# Patient Record
Sex: FEMALE | Race: WHITE | NOT HISPANIC OR LATINO | URBAN - METROPOLITAN AREA
[De-identification: names, ages, dates, MRNs, and addresses within clinical notes are randomized per-mention and may not be internally consistent; named-entity substitution may affect disease eponyms.]

---

## 2021-10-11 ENCOUNTER — EMERGENCY (EMERGENCY)
Facility: HOSPITAL | Age: 51
LOS: 1 days | Discharge: HOME | End: 2021-10-11
Attending: EMERGENCY MEDICINE
Payer: COMMERCIAL

## 2021-10-11 VITALS
SYSTOLIC BLOOD PRESSURE: 121 MMHG | HEIGHT: 62.5 IN | DIASTOLIC BLOOD PRESSURE: 78 MMHG | WEIGHT: 104.94 LBS | OXYGEN SATURATION: 100 % | HEART RATE: 77 BPM | TEMPERATURE: 98 F | RESPIRATION RATE: 20 BRPM

## 2021-10-11 LAB
ALBUMIN SERPL ELPH-MCNC: 5 G/DL — SIGNIFICANT CHANGE UP (ref 3.3–5)
ALP SERPL-CCNC: 74 U/L — SIGNIFICANT CHANGE UP (ref 40–120)
ALT FLD-CCNC: 20 U/L — SIGNIFICANT CHANGE UP (ref 10–45)
ANION GAP SERPL CALC-SCNC: 14 MMOL/L — SIGNIFICANT CHANGE UP (ref 5–17)
APTT BLD: 36.6 SEC — HIGH (ref 27.5–35.5)
AST SERPL-CCNC: 24 U/L — SIGNIFICANT CHANGE UP (ref 10–40)
BASOPHILS # BLD AUTO: 0.04 K/UL — SIGNIFICANT CHANGE UP (ref 0–0.2)
BASOPHILS NFR BLD AUTO: 0.5 % — SIGNIFICANT CHANGE UP (ref 0–2)
BILIRUB SERPL-MCNC: 0.7 MG/DL — SIGNIFICANT CHANGE UP (ref 0.2–1.2)
BUN SERPL-MCNC: 13 MG/DL — SIGNIFICANT CHANGE UP (ref 7–23)
CALCIUM SERPL-MCNC: 9.9 MG/DL — SIGNIFICANT CHANGE UP (ref 8.4–10.5)
CHLORIDE SERPL-SCNC: 100 MMOL/L — SIGNIFICANT CHANGE UP (ref 96–108)
CO2 SERPL-SCNC: 23 MMOL/L — SIGNIFICANT CHANGE UP (ref 22–31)
CREAT SERPL-MCNC: 0.62 MG/DL — SIGNIFICANT CHANGE UP (ref 0.5–1.3)
EOSINOPHIL # BLD AUTO: 0.21 K/UL — SIGNIFICANT CHANGE UP (ref 0–0.5)
EOSINOPHIL NFR BLD AUTO: 2.7 % — SIGNIFICANT CHANGE UP (ref 0–6)
GLUCOSE SERPL-MCNC: 97 MG/DL — SIGNIFICANT CHANGE UP (ref 70–99)
HCT VFR BLD CALC: 43.3 % — SIGNIFICANT CHANGE UP (ref 34.5–45)
HGB BLD-MCNC: 14.9 G/DL — SIGNIFICANT CHANGE UP (ref 11.5–15.5)
IMM GRANULOCYTES NFR BLD AUTO: 0.3 % — SIGNIFICANT CHANGE UP (ref 0–1.5)
INR BLD: 0.97 RATIO — SIGNIFICANT CHANGE UP (ref 0.88–1.16)
LYMPHOCYTES # BLD AUTO: 2.27 K/UL — SIGNIFICANT CHANGE UP (ref 1–3.3)
LYMPHOCYTES # BLD AUTO: 29.3 % — SIGNIFICANT CHANGE UP (ref 13–44)
MCHC RBC-ENTMCNC: 31.7 PG — SIGNIFICANT CHANGE UP (ref 27–34)
MCHC RBC-ENTMCNC: 34.4 GM/DL — SIGNIFICANT CHANGE UP (ref 32–36)
MCV RBC AUTO: 92.1 FL — SIGNIFICANT CHANGE UP (ref 80–100)
MONOCYTES # BLD AUTO: 0.53 K/UL — SIGNIFICANT CHANGE UP (ref 0–0.9)
MONOCYTES NFR BLD AUTO: 6.8 % — SIGNIFICANT CHANGE UP (ref 2–14)
NEUTROPHILS # BLD AUTO: 4.68 K/UL — SIGNIFICANT CHANGE UP (ref 1.8–7.4)
NEUTROPHILS NFR BLD AUTO: 60.4 % — SIGNIFICANT CHANGE UP (ref 43–77)
NRBC # BLD: 0 /100 WBCS — SIGNIFICANT CHANGE UP (ref 0–0)
PLATELET # BLD AUTO: 319 K/UL — SIGNIFICANT CHANGE UP (ref 150–400)
POTASSIUM SERPL-MCNC: 3.9 MMOL/L — SIGNIFICANT CHANGE UP (ref 3.5–5.3)
POTASSIUM SERPL-SCNC: 3.9 MMOL/L — SIGNIFICANT CHANGE UP (ref 3.5–5.3)
PROT SERPL-MCNC: 8 G/DL — SIGNIFICANT CHANGE UP (ref 6–8.3)
PROTHROM AB SERPL-ACNC: 11.6 SEC — SIGNIFICANT CHANGE UP (ref 10.6–13.6)
RBC # BLD: 4.7 M/UL — SIGNIFICANT CHANGE UP (ref 3.8–5.2)
RBC # FLD: 12.8 % — SIGNIFICANT CHANGE UP (ref 10.3–14.5)
SODIUM SERPL-SCNC: 137 MMOL/L — SIGNIFICANT CHANGE UP (ref 135–145)
WBC # BLD: 7.75 K/UL — SIGNIFICANT CHANGE UP (ref 3.8–10.5)
WBC # FLD AUTO: 7.75 K/UL — SIGNIFICANT CHANGE UP (ref 3.8–10.5)

## 2021-10-11 PROCEDURE — 80053 COMPREHEN METABOLIC PANEL: CPT

## 2021-10-11 PROCEDURE — 70450 CT HEAD/BRAIN W/O DYE: CPT | Mod: 26,MG,59

## 2021-10-11 PROCEDURE — G1004: CPT

## 2021-10-11 PROCEDURE — 70450 CT HEAD/BRAIN W/O DYE: CPT | Mod: MG

## 2021-10-11 PROCEDURE — 70498 CT ANGIOGRAPHY NECK: CPT | Mod: MG

## 2021-10-11 PROCEDURE — 99284 EMERGENCY DEPT VISIT MOD MDM: CPT | Mod: 25

## 2021-10-11 PROCEDURE — 85730 THROMBOPLASTIN TIME PARTIAL: CPT

## 2021-10-11 PROCEDURE — 99284 EMERGENCY DEPT VISIT MOD MDM: CPT

## 2021-10-11 PROCEDURE — 70498 CT ANGIOGRAPHY NECK: CPT | Mod: 26,MG

## 2021-10-11 PROCEDURE — 70496 CT ANGIOGRAPHY HEAD: CPT | Mod: 26,MG

## 2021-10-11 PROCEDURE — 70496 CT ANGIOGRAPHY HEAD: CPT | Mod: MG

## 2021-10-11 PROCEDURE — 85610 PROTHROMBIN TIME: CPT

## 2021-10-11 PROCEDURE — 85025 COMPLETE CBC W/AUTO DIFF WBC: CPT

## 2021-10-11 NOTE — CONSULT NOTE ADULT - SUBJECTIVE AND OBJECTIVE BOX
HPI:  51 y.o. R-H F with PMH of strabisumus of R, laser surgery on L (with baseline floaters, dry eyes) presented with L pupil dilation. The sx started on 10/9 PM. Was at the daughter's football game and noted that her L pupil was dilated. No headache, new blurry vision, pain in b/l eyes, syncopal episode, or seizure episodes. Went to opthalmologist on 10/11 PM and exam was unremarkable other than anisocoria. Baseline vision is blurry on R and L vision is "not great" per pt. Was sent to ED to r/o aneurysm. Takes vitamin supplements, fish oil, and calcium. No new medications started. Lives at home with family, no sick contacts, independent with ADLs. No recent eye procedure on b/l eyes. Denies any hx of seizure or stroke. Currently denies any headache, diplopia, other vision changes, n/v, chest pain, abd pain, numbness/pain/tingling/weakness in all extremities.     REVIEW OF SYSTEMS    A 10-system ROS was performed and is negative except for those items noted above and/or in the HPI.    PAST MEDICAL & SURGICAL HISTORY:  No pertinent past medical history    No significant past surgical history      FAMILY HISTORY:    SOCIAL HISTORY: as noted in HPI    MEDICATIONS (HOME):  Home Medications:    MEDICATIONS  (STANDING):    MEDICATIONS  (PRN):    ALLERGIES/INTOLERANCES:  Allergies  Allergy Status Unknown    Intolerances    VITALS & EXAMINATION:  Vital Signs Last 24 Hrs  T(C): 36.9 (11 Oct 2021 17:31), Max: 36.9 (11 Oct 2021 17:31)  T(F): 98.4 (11 Oct 2021 17:31), Max: 98.4 (11 Oct 2021 17:31)  HR: 77 (11 Oct 2021 17:31) (77 - 77)  BP: 121/78 (11 Oct 2021 17:31) (121/78 - 121/78)  BP(mean): --  RR: 20 (11 Oct 2021 17:31) (20 - 20)  SpO2: 100% (11 Oct 2021 17:31) (100% - 100%)    General:  Constitutional: thin Female, appears stated age, in no apparent distress including pain  Head: Normocephalic & atraumatic  EYE: R 20/100 and L 20/25 (baseline vision)    Neurological (>12):  MS: Awake, alert, oriented to person, place, situation, time. Normal affect. Follows all commands.    Language: Speech is clear, fluent with good repetition & comprehension (able to name objects pen, socks, shoes)    CNs: PERRL (R = 3mm, L = 6mm). VFF. EOMI no nystagmus, no diplopia. V1-3 intact to light touch, well developed masseter muscles b/l. No facial asymmetry b/l, full eye closure strength b/l. Hearing grossly normal (rubbing fingers) b/l. Head turning & shoulder shrug intact b/l. Tongue midline, normal movements, no atrophy.    Motor: Normal muscle bulk & tone. No noticeable tremor. No pronator drift.              Deltoid	Biceps	Triceps	Wrist	   R	5	5	5	5	5			  L	5	5	5	5	5		    	H-Flex	H-Ext	K-Flex	K-Ext	D-Flex	P-Flex  R	5	5	5	5	5	5		   L	5	5	5	5	5	5		     Sensation: Intact to light touch throughout    Cortical: Extinction on DSS (neglect): none    Reflexes:              Biceps(C5)       BR(C6)     Patellar(L4)    Achilles(S1)    Plantar Resp  R	2	          2	             2		        2		mute  L	2	          2	             2		        2		mute    Coordination: intact rapid-alt movements. No dysmetria to FTN    LABORATORY:  CBC                       14.9   7.75  )-----------( 319      ( 11 Oct 2021 17:52 )             43.3     Chem 10-11    137  |  100  |  13  ----------------------------<  97  3.9   |  23  |  0.62    Ca    9.9      11 Oct 2021 17:52    TPro  8.0  /  Alb  5.0  /  TBili  0.7  /  DBili  x   /  AST  24  /  ALT  20  /  AlkPhos  74  10-11    LFTs LIVER FUNCTIONS - ( 11 Oct 2021 17:52 )  Alb: 5.0 g/dL / Pro: 8.0 g/dL / ALK PHOS: 74 U/L / ALT: 20 U/L / AST: 24 U/L / GGT: x           Coagulopathy PT/INR - ( 11 Oct 2021 17:52 )   PT: 11.6 sec;   INR: 0.97 ratio         PTT - ( 11 Oct 2021 17:52 )  PTT:36.6 sec    STUDIES & IMAGING:  Studies (EKG, EEG, EMG, etc):     Radiology (XR, CT, MR, U/S, TTE/BRIAN):  < from: CT Angio Head w/ IV Cont (10.11.21 @ 18:18) >  IMPRESSION:    CT brain: No acute intracranial hemorrhage, mass effect, midline shift.    CTA neck and brain: No vessel occlusion or significant stenosis. No aneurysm.    --- End of Report ---    < end of copied text >   HPI:  51 y.o. R-H F with PMH of strabisumus of R, laser surgery on L (with baseline floaters, dry eyes) presented with L pupil dilation. The sx started on 10/9 PM. Was at the daughter's football game and noted that her L pupil was dilated. No headache, new blurry vision, pain in b/l eyes, syncopal episode, or seizure episodes. Went to opthalmologist on 10/11 PM and exam was unremarkable other than anisocoria. Baseline vision is blurry on R and L vision is "not great" per pt. Was sent to ED to r/o aneurysm. Takes vitamin supplements, fish oil, and calcium. No new medications started. Lives at home with family, no sick contacts, independent with ADLs. No recent eye procedure on b/l eyes. Denies any hx of seizure or stroke. Currently denies any headache, diplopia, other vision changes, n/v, chest pain, abd pain, numbness/pain/tingling/weakness in all extremities.     REVIEW OF SYSTEMS    A 10-system ROS was performed and is negative except for those items noted above and/or in the HPI.    PAST MEDICAL & SURGICAL HISTORY:  No pertinent past medical history    No significant past surgical history      FAMILY HISTORY:    SOCIAL HISTORY: as noted in HPI    MEDICATIONS (HOME):  Home Medications:    MEDICATIONS  (STANDING):    MEDICATIONS  (PRN):    ALLERGIES/INTOLERANCES:  Allergies  Allergy Status Unknown    Intolerances    VITALS & EXAMINATION:  Vital Signs Last 24 Hrs  T(C): 36.9 (11 Oct 2021 17:31), Max: 36.9 (11 Oct 2021 17:31)  T(F): 98.4 (11 Oct 2021 17:31), Max: 98.4 (11 Oct 2021 17:31)  HR: 77 (11 Oct 2021 17:31) (77 - 77)  BP: 121/78 (11 Oct 2021 17:31) (121/78 - 121/78)  BP(mean): --  RR: 20 (11 Oct 2021 17:31) (20 - 20)  SpO2: 100% (11 Oct 2021 17:31) (100% - 100%)    General:  Constitutional: thin Female, appears stated age, in no apparent distress including pain  Head: Normocephalic & atraumatic  EYE: R 20/100 and L 20/25 (baseline vision)    Neurological (>12):  MS: Awake, alert, oriented to person, place, situation, time. Normal affect. Follows all commands.    Language: Speech is clear, fluent with good repetition & comprehension (able to name objects pen, socks, shoes)    CNs: PERRL (R = 3mm, L = 6mm). No ptosis. Normal gaze at natural gaze; no out and downward gaze noted. VFF. EOMI no nystagmus, no diplopia. V1-3 intact to light touch, well developed masseter muscles b/l. No facial asymmetry b/l, full eye closure strength b/l. Hearing grossly normal (rubbing fingers) b/l. Head turning & shoulder shrug intact b/l. Tongue midline, normal movements, no atrophy.    Motor: Normal muscle bulk & tone. No noticeable tremor.              Deltoid	Biceps	Triceps	Wrist	   R	5	5	5	5	5			  L	5	5	5	5	5		    	H-Flex	H-Ext	K-Flex	K-Ext	D-Flex	P-Flex  R	5	5	5	5	5	5		   L	5	5	5	5	5	5		     Sensation: Intact to light touch throughout    Cortical: Extinction on DSS (neglect): none    Reflexes:              Biceps(C5)       BR(C6)     Patellar(L4)    Achilles(S1)    Plantar Resp  R	2	          2	             2		        2		mute  L	2	          2	             2		        2		mute    Coordination: intact rapid-alt movements. No dysmetria to FTN    LABORATORY:  CBC                       14.9   7.75  )-----------( 319      ( 11 Oct 2021 17:52 )             43.3     Chem 10-11    137  |  100  |  13  ----------------------------<  97  3.9   |  23  |  0.62    Ca    9.9      11 Oct 2021 17:52    TPro  8.0  /  Alb  5.0  /  TBili  0.7  /  DBili  x   /  AST  24  /  ALT  20  /  AlkPhos  74  10-11    LFTs LIVER FUNCTIONS - ( 11 Oct 2021 17:52 )  Alb: 5.0 g/dL / Pro: 8.0 g/dL / ALK PHOS: 74 U/L / ALT: 20 U/L / AST: 24 U/L / GGT: x           Coagulopathy PT/INR - ( 11 Oct 2021 17:52 )   PT: 11.6 sec;   INR: 0.97 ratio         PTT - ( 11 Oct 2021 17:52 )  PTT:36.6 sec    STUDIES & IMAGING:  Studies (EKG, EEG, EMG, etc):     Radiology (XR, CT, MR, U/S, TTE/BRIAN):  < from: CT Angio Head w/ IV Cont (10.11.21 @ 18:18) >  IMPRESSION:    CT brain: No acute intracranial hemorrhage, mass effect, midline shift.    CTA neck and brain: No vessel occlusion or significant stenosis. No aneurysm.    --- End of Report ---    < end of copied text >

## 2021-10-11 NOTE — ED ADULT NURSE NOTE - OBJECTIVE STATEMENT
52 y/o female coming in from home complaining of unilateral pupil dilation. AOx4, ambulatory, denies any significant PMH. Pt. reports Saturday morning she woke up and realized her left pupil was larger than her right pupil. Pt. saw her ophthalmologist today who advised the patient to come to the ED. Left pupil larger than right pupil, both with brisk constriction. No weakness in any extremities or numbness/tingling. Denies any headaches, blurry vision, double vision, N/V/D. Denies any other complaints. VSS. Will continue to reassess.

## 2021-10-11 NOTE — ED PROVIDER NOTE - PHYSICAL EXAMINATION
GENERAL: no acute distress, non-toxic appearing  HEAD: normocephalic, atraumatic  HEENT: normal conjunctiva, oral mucosa moist, neck supple  CARDIAC: regular rate and rhythm, normal S1 and S2,  no appreciable murmurs  PULM: clear to ascultation bilaterally, no crackles, rales, rhonchi, or wheezing  GI: abdomen nondistended, soft, nontender, no guarding or rebound tenderness  : no CVA tenderness, no suprapubic tenderness  NEURO: +L eye dilation .5 cm, pupil does constrict to light and has consensual light reflex however less than R pupil, alert and oriented x 3, normal speech, EOMI, no focal motor or sensory deficits, nonantalgic gait  MSK: no visible deformities, no peripheral edema, calf tenderness/redness/swelling  SKIN: no visible rashes, dry, well-perfused  PSYCH: appropriate mood and affect

## 2021-10-11 NOTE — ED PROVIDER NOTE - RAPID ASSESSMENT
51y F p/w L pupil bigger than the R x3 days. Seen by optho and referred to ED for 3rd nerve palsy and r/o aneurysm. Denies headache, blurry vision, double vision, nausea, vomiting. No hx of known aneurysm. No family hx of aneurysm. NKDA.    Patient was seen as a tele QDOC patient. The patient will be seen and further worked up in the main emergency department and their care will be completed by the main emergency department team along with a thorough physical exam. Receiving team will follow up on labs, analgesia, any clinical imaging, reassess and disposition as clinically indicated, all decisions regarding the progression of care will be made at their discretion.    Scribe Statement: I, Cammy Wilkins, attest that this documentation has been prepared under the direction and in the presence of Raheel Mabry) 51y F p/w L pupil bigger than the R x3 days. Seen by optho and referred to ED for 3rd nerve palsy and r/o aneurysm. Denies headache, blurry vision, double vision, nausea, vomiting. No hx of known aneurysm. No family hx of aneurysm. NKDA.    Patient was seen as a tele QDOC patient. The patient will be seen and further worked up in the main emergency department and their care will be completed by the main emergency department team along with a thorough physical exam. Receiving team will follow up on labs, analgesia, any clinical imaging, reassess and disposition as clinically indicated, all decisions regarding the progression of care will be made at their discretion.    Scribe Statement: I, Cammy Wilkins, attest that this documentation has been prepared under the direction and in the presence of Raheel Mabry)    Clotilde SPANGLER: The scribe's documentation has been prepared under my direction and personally reviewed by me in its entirety. I confirm that the note above accurately reflects all work, treatment, procedures, and medical decision making performed by me (Dr. Mabry).

## 2021-10-11 NOTE — ED PROVIDER NOTE - PATIENT PORTAL LINK FT
You can access the FollowMyHealth Patient Portal offered by [facility] by registering at the following website: [email or web address]. By joining [facility]’s FollowMyHealth portal, you will also be able to view your health information using other applications (apps) compatible with our system.

## 2021-10-11 NOTE — CONSULT NOTE ADULT - ASSESSMENT
51 y.o. R-H F with PMH of strabisumus of R, laser surgery on L (with baseline floaters, dry eyes) presented with L pupil dilation since 10/9. Denies any headache, diplopia, pain, or other changes in vision. Neuro exam reveals L pupil size 6 mm but reactive to light otherwise unremarkable. CTH/CTA neg for acute hemorrhage and aneurysm. Labs unremarkable except PTT 36.6.    Impression: Isolated anisocoria (L pupil 6 mm) of unknown etiology. Consider any topical irritation vs OCT medication side effect vs less likely toxic metabolic or infectious etiology    Recommendations:  [] f/u outpt with Dr. Campbell, pt's ophthalmologist  [] No contraindication for discharge    Case discussed with general neurology attending Dr. Keller. To be seen in AM if remains in hospital

## 2021-10-11 NOTE — ED ADULT NURSE NOTE - NSIMPLEMENTINTERV_GEN_ALL_ED
Implemented All Universal Safety Interventions:  [place] to call system. Call bell, personal items and telephone within reach. Instruct patient to call for assistance. Room bathroom lighting operational. Non-slip footwear when patient is off stretcher. Physically safe environment: no spills, clutter or unnecessary equipment. Stretcher in lowest position, wheels locked, appropriate side rails in place.

## 2021-10-11 NOTE — ED PROVIDER NOTE - NS ED ROS FT
GENERAL: no fever, no chills, no weight loss  EYES: +L eye dilation, no change in vision, no irritation, no discharge, no redness, no pain    CARDIAC: no chest pain, no palpitations   PULMONARY: no cough, no shortness of breath, no wheezing  GI: no abdominal pain, no nausea, no vomiting, no diarrhea, no constipation  SKIN: no rashes  NEURO: no headache, no numbness, no weakness  MSK: no joint pain, no muscle pain, no back pain, no calf pain

## 2021-10-11 NOTE — ED ADULT TRIAGE NOTE - CHIEF COMPLAINT QUOTE
Left pupil larger than right since saturday. Sent by [facility] for further workup to rule out an aneurism.

## 2021-10-11 NOTE — ED PROVIDER NOTE - OBJECTIVE STATEMENT
52 yo F no pmh presents with 3 day hx of L pupil dilation. Saw ophthalmologist in the AM today who sent her to ED to be evaluated for aneurysm, third nerve palsy. Some associated L eye dryness. No inciting trauma, no headaches, no neck/spinal manipulation hx. Denies changes in vision, nausea, vomiting.

## 2021-10-11 NOTE — ED ADULT NURSE NOTE - CHIEF COMPLAINT QUOTE
Left pupil larger than right since saturday. Sent by [facility] for further workup to rule out an aneurism.

## 2021-10-11 NOTE — ED PROVIDER NOTE - CLINICAL SUMMARY MEDICAL DECISION MAKING FREE TEXT BOX
50 yo F no medical hx presenting with L pupillary miosis. VSS, L pupillary reflex and consensual pupil response intact. No other FND. CTH/CTA head and neck negative for aneurysm, CN palsy. Neuro consult. 50 yo F no medical hx presenting with L pupillary miosis. VSS, L pupillary reflex and consensual pupil response intact. No other FND. CTH/CTA head and neck negative for aneurysm, CN palsy. Neuro consult.    PEDRO Ruiz MD: Agree with resident MDM, assessment and plan as above. Pt seen by Opthalmologist today who sent her in ([facility] MD) for neurologic w/u

## 2022-01-20 NOTE — CONSULT NOTE ADULT - CONSULT REQUESTED DATE/TIME
Fax received from [facility] PT/INR self testing for you to review.    Out of prescribed range.  
Noted, thank you  Results addressed by [facility]. See other 1/20/22 encounter for details.   
11-Oct-2021 21:17

## 2022-11-14 ENCOUNTER — RX ONLY (RX ONLY)
Age: 52
End: 2022-11-14

## 2022-11-14 ENCOUNTER — OFFICE (OUTPATIENT)
Dept: URBAN - METROPOLITAN AREA CLINIC 109 | Facility: CLINIC | Age: 52
Setting detail: OPHTHALMOLOGY
End: 2022-11-14
Payer: COMMERCIAL

## 2022-11-14 DIAGNOSIS — H40.013: ICD-10-CM

## 2022-11-14 DIAGNOSIS — H00.11: ICD-10-CM

## 2022-11-14 PROCEDURE — 99213 OFFICE O/P EST LOW 20 MIN: CPT | Performed by: OPHTHALMOLOGY

## 2022-11-14 ASSESSMENT — REFRACTION_CURRENTRX
OS_AXIS: 11
OD_CYLINDER: -0.25
OS_CYLINDER: -0.50
OD_OVR_VA: 20/
OS_SPHERE: -1.50
OD_CYLINDER: -0.50
OD_SPHERE: -2.50
OS_SPHERE: -1.50
OD_AXIS: 45
OD_SPHERE: -2.50
OD_AXIS: 59
OS_CYLINDER: -0.50
OS_AXIS: 17
OS_VPRISM_DIRECTION: SV
OD_OVR_VA: 20/
OD_VPRISM_DIRECTION: SV
OS_OVR_VA: 20/
OS_OVR_VA: 20/

## 2022-11-14 ASSESSMENT — REFRACTION_AUTOREFRACTION
OS_SPHERE: -3.00
OS_CYLINDER: -0.50
OD_AXIS: 37
OS_AXIS: 45
OD_SPHERE: -4.00
OD_CYLINDER: -0.50

## 2022-11-14 ASSESSMENT — REFRACTION_MANIFEST
OD_CYLINDER: -0.50
OS_CYLINDER: -0.50
OS_SPHERE: -2.00
OD_AXIS: 46
OD_CYLINDER: -0.50
OD_AXIS: 51
OD_SPHERE: -3.50
OS_AXIS: 18
OS_SPHERE: -1.50
OD_SPHERE: -2.50
OS_CYLINDER: -0.50
OS_AXIS: 14

## 2022-11-14 ASSESSMENT — SPHEQUIV_DERIVED
OS_SPHEQUIV: -2.25
OS_SPHEQUIV: -3.25
OD_SPHEQUIV: -2.75
OD_SPHEQUIV: -4.25
OD_SPHEQUIV: -3.75
OS_SPHEQUIV: -1.75

## 2022-11-14 ASSESSMENT — CONFRONTATIONAL VISUAL FIELD TEST (CVF)
OD_FINDINGS: FULL
OS_FINDINGS: FULL

## 2022-11-14 ASSESSMENT — VISUAL ACUITY
OD_BCVA: 20/30-1
OS_BCVA: 20/30

## 2022-11-14 ASSESSMENT — SUPERFICIAL PUNCTATE KERATITIS (SPK)
OS_SPK: 2+
OD_SPK: 2+

## 2022-11-14 ASSESSMENT — TONOMETRY
OD_IOP_MMHG: 14
OS_IOP_MMHG: 14

## 2022-12-19 ENCOUNTER — OFFICE (OUTPATIENT)
Dept: URBAN - METROPOLITAN AREA CLINIC 109 | Facility: CLINIC | Age: 52
Setting detail: OPHTHALMOLOGY
End: 2022-12-19
Payer: COMMERCIAL

## 2022-12-19 ENCOUNTER — RX ONLY (RX ONLY)
Age: 52
End: 2022-12-19

## 2022-12-19 DIAGNOSIS — H16.223: ICD-10-CM

## 2022-12-19 DIAGNOSIS — H00.11: ICD-10-CM

## 2022-12-19 DIAGNOSIS — H40.013: ICD-10-CM

## 2022-12-19 DIAGNOSIS — H02.89: ICD-10-CM

## 2022-12-19 PROCEDURE — 99213 OFFICE O/P EST LOW 20 MIN: CPT | Performed by: OPHTHALMOLOGY

## 2022-12-19 ASSESSMENT — REFRACTION_AUTOREFRACTION
OS_AXIS: 45
OD_SPHERE: -4.00
OS_CYLINDER: -0.50
OD_CYLINDER: -0.50
OS_SPHERE: -3.00
OD_AXIS: 37

## 2022-12-19 ASSESSMENT — CONFRONTATIONAL VISUAL FIELD TEST (CVF)
OD_FINDINGS: FULL
OS_FINDINGS: FULL

## 2022-12-19 ASSESSMENT — REFRACTION_MANIFEST
OD_SPHERE: -3.50
OS_SPHERE: -2.00
OD_AXIS: 46
OS_AXIS: 14
OD_AXIS: 51
OD_CYLINDER: -0.50
OS_SPHERE: -1.50
OS_CYLINDER: -0.50
OS_AXIS: 18
OS_CYLINDER: -0.50
OD_CYLINDER: -0.50
OD_SPHERE: -2.50

## 2022-12-19 ASSESSMENT — REFRACTION_CURRENTRX
OD_OVR_VA: 20/
OS_AXIS: 17
OS_AXIS: 11
OS_OVR_VA: 20/
OS_CYLINDER: -0.50
OS_SPHERE: -1.50
OD_CYLINDER: -0.50
OS_OVR_VA: 20/
OD_SPHERE: -2.50
OD_AXIS: 59
OD_AXIS: 45
OD_SPHERE: -2.50
OD_CYLINDER: -0.25
OD_OVR_VA: 20/
OD_VPRISM_DIRECTION: SV
OS_VPRISM_DIRECTION: SV
OS_CYLINDER: -0.50
OS_SPHERE: -1.50

## 2022-12-19 ASSESSMENT — SPHEQUIV_DERIVED
OD_SPHEQUIV: -3.75
OD_SPHEQUIV: -2.75
OS_SPHEQUIV: -3.25
OS_SPHEQUIV: -1.75
OD_SPHEQUIV: -4.25
OS_SPHEQUIV: -2.25

## 2022-12-19 ASSESSMENT — VISUAL ACUITY
OS_BCVA: 20/30
OD_BCVA: 20/30-1

## 2022-12-19 ASSESSMENT — TONOMETRY
OS_IOP_MMHG: 16
OD_IOP_MMHG: 16

## 2022-12-19 ASSESSMENT — SUPERFICIAL PUNCTATE KERATITIS (SPK)
OD_SPK: 2+
OS_SPK: 2+

## 2023-01-11 ENCOUNTER — OFFICE (OUTPATIENT)
Dept: URBAN - METROPOLITAN AREA CLINIC 109 | Facility: CLINIC | Age: 53
Setting detail: OPHTHALMOLOGY
End: 2023-01-11
Payer: COMMERCIAL

## 2023-01-11 DIAGNOSIS — H00.14: ICD-10-CM

## 2023-01-11 PROBLEM — H00.11 CHALAZION; RIGHT UPPER LID: Status: ACTIVE | Noted: 2023-01-11

## 2023-01-11 PROCEDURE — 99213 OFFICE O/P EST LOW 20 MIN: CPT | Performed by: OPHTHALMOLOGY

## 2023-01-11 ASSESSMENT — REFRACTION_CURRENTRX
OS_OVR_VA: 20/
OS_CYLINDER: -0.50
OS_AXIS: 17
OS_CYLINDER: -0.50
OD_SPHERE: -2.50
OD_OVR_VA: 20/
OD_AXIS: 59
OD_VPRISM_DIRECTION: SV
OD_AXIS: 45
OS_OVR_VA: 20/
OD_OVR_VA: 20/
OD_CYLINDER: -0.50
OS_VPRISM_DIRECTION: SV
OS_AXIS: 11
OS_SPHERE: -1.50
OD_SPHERE: -2.50
OD_CYLINDER: -0.25
OS_SPHERE: -1.50

## 2023-01-11 ASSESSMENT — REFRACTION_MANIFEST
OS_CYLINDER: -0.50
OS_CYLINDER: -0.50
OS_SPHERE: -2.00
OS_AXIS: 14
OD_SPHERE: -3.50
OS_AXIS: 18
OD_AXIS: 46
OD_SPHERE: -2.50
OD_CYLINDER: -0.50
OD_AXIS: 51
OD_CYLINDER: -0.50
OS_SPHERE: -1.50

## 2023-01-11 ASSESSMENT — REFRACTION_AUTOREFRACTION
OS_CYLINDER: -0.50
OD_SPHERE: -4.00
OS_SPHERE: -3.00
OD_AXIS: 37
OD_CYLINDER: -0.50
OS_AXIS: 45

## 2023-01-11 ASSESSMENT — SPHEQUIV_DERIVED
OS_SPHEQUIV: -1.75
OS_SPHEQUIV: -3.25
OD_SPHEQUIV: -2.75
OD_SPHEQUIV: -4.25
OD_SPHEQUIV: -3.75
OS_SPHEQUIV: -2.25

## 2023-01-11 ASSESSMENT — SUPERFICIAL PUNCTATE KERATITIS (SPK)
OS_SPK: 2+
OD_SPK: 2+

## 2023-01-11 ASSESSMENT — CONFRONTATIONAL VISUAL FIELD TEST (CVF)
OD_FINDINGS: FULL
OS_FINDINGS: FULL

## 2023-01-11 ASSESSMENT — VISUAL ACUITY
OS_BCVA: 20/30+1
OD_BCVA: 20/25-2

## 2023-02-15 ENCOUNTER — OFFICE (OUTPATIENT)
Dept: URBAN - METROPOLITAN AREA CLINIC 109 | Facility: CLINIC | Age: 53
Setting detail: OPHTHALMOLOGY
End: 2023-02-15
Payer: COMMERCIAL

## 2023-02-15 DIAGNOSIS — H35.40: ICD-10-CM

## 2023-02-15 DIAGNOSIS — H02.89: ICD-10-CM

## 2023-02-15 DIAGNOSIS — H40.013: ICD-10-CM

## 2023-02-15 DIAGNOSIS — H43.393: ICD-10-CM

## 2023-02-15 PROBLEM — H00.11 CHALAZION; RIGHT UPPER LID: Status: ACTIVE | Noted: 2023-02-15

## 2023-02-15 PROCEDURE — 92083 EXTENDED VISUAL FIELD XM: CPT | Performed by: OPHTHALMOLOGY

## 2023-02-15 PROCEDURE — 92014 COMPRE OPH EXAM EST PT 1/>: CPT | Performed by: OPHTHALMOLOGY

## 2023-02-15 PROCEDURE — 92250 FUNDUS PHOTOGRAPHY W/I&R: CPT | Performed by: OPHTHALMOLOGY

## 2023-02-15 ASSESSMENT — REFRACTION_CURRENTRX
OD_OVR_VA: 20/
OD_AXIS: 45
OS_AXIS: 11
OD_OVR_VA: 20/
OS_OVR_VA: 20/
OD_CYLINDER: -0.50
OD_VPRISM_DIRECTION: SV
OD_AXIS: 59
OS_SPHERE: -1.50
OS_VPRISM_DIRECTION: SV
OD_SPHERE: -2.50
OS_SPHERE: -1.50
OS_CYLINDER: -0.50
OS_CYLINDER: -0.50
OD_CYLINDER: -0.25
OS_AXIS: 17
OS_OVR_VA: 20/
OD_SPHERE: -2.50

## 2023-02-15 ASSESSMENT — REFRACTION_MANIFEST
OS_AXIS: 14
OS_CYLINDER: -0.50
OS_AXIS: 18
OD_AXIS: 46
OD_AXIS: 51
OS_CYLINDER: -0.50
OD_CYLINDER: -0.50
OD_CYLINDER: -0.50
OD_SPHERE: -3.50
OS_SPHERE: -2.00
OD_SPHERE: -2.50
OS_SPHERE: -1.50

## 2023-02-15 ASSESSMENT — SPHEQUIV_DERIVED
OD_SPHEQUIV: -3.75
OS_SPHEQUIV: -2.25
OS_SPHEQUIV: -3.25
OD_SPHEQUIV: -2.75
OS_SPHEQUIV: -1.75
OD_SPHEQUIV: -4.25

## 2023-02-15 ASSESSMENT — REFRACTION_AUTOREFRACTION
OS_CYLINDER: -0.50
OD_SPHERE: -4.00
OS_SPHERE: -3.00
OD_CYLINDER: -0.50
OD_AXIS: 37
OS_AXIS: 45

## 2023-02-15 ASSESSMENT — SUPERFICIAL PUNCTATE KERATITIS (SPK)
OD_SPK: 2+
OS_SPK: 2+

## 2023-02-15 ASSESSMENT — VISUAL ACUITY
OD_BCVA: 20/25-2
OS_BCVA: 20/30-1

## 2023-02-15 ASSESSMENT — TONOMETRY
OD_IOP_MMHG: 14
OS_IOP_MMHG: 14

## 2023-02-15 ASSESSMENT — CONFRONTATIONAL VISUAL FIELD TEST (CVF)
OS_FINDINGS: FULL
OD_FINDINGS: FULL

## 2023-03-14 ENCOUNTER — OFFICE (OUTPATIENT)
Dept: URBAN - METROPOLITAN AREA CLINIC 32 | Facility: CLINIC | Age: 53
Setting detail: OPHTHALMOLOGY
End: 2023-03-14
Payer: COMMERCIAL

## 2023-03-14 DIAGNOSIS — H35.40: ICD-10-CM

## 2023-03-14 DIAGNOSIS — H43.393: ICD-10-CM

## 2023-03-14 DIAGNOSIS — H44.23: ICD-10-CM

## 2023-03-14 PROBLEM — H00.11 CHALAZION; RIGHT UPPER LID: Status: ACTIVE | Noted: 2023-03-14

## 2023-03-14 PROCEDURE — 92201 OPSCPY EXTND RTA DRAW UNI/BI: CPT | Performed by: OPHTHALMOLOGY

## 2023-03-14 PROCEDURE — 92134 CPTRZ OPH DX IMG PST SGM RTA: CPT | Performed by: OPHTHALMOLOGY

## 2023-03-14 PROCEDURE — 92012 INTRM OPH EXAM EST PATIENT: CPT | Performed by: OPHTHALMOLOGY

## 2023-03-14 ASSESSMENT — REFRACTION_AUTOREFRACTION
OS_CYLINDER: -0.50
OS_AXIS: 45
OS_SPHERE: -3.00
OD_SPHERE: -4.00
OD_AXIS: 37
OD_CYLINDER: -0.50

## 2023-03-14 ASSESSMENT — VISUAL ACUITY
OS_BCVA: 20/30-
OD_BCVA: 20/30-2

## 2023-03-14 ASSESSMENT — SPHEQUIV_DERIVED
OD_SPHEQUIV: -4.25
OS_SPHEQUIV: -3.25

## 2023-03-14 ASSESSMENT — SUPERFICIAL PUNCTATE KERATITIS (SPK)
OS_SPK: 2+
OD_SPK: 2+

## 2023-03-14 ASSESSMENT — CONFRONTATIONAL VISUAL FIELD TEST (CVF)
OD_FINDINGS: FULL
OS_FINDINGS: FULL

## 2023-05-24 ENCOUNTER — RX ONLY (RX ONLY)
Age: 53
End: 2023-05-24

## 2023-05-24 ENCOUNTER — OFFICE (OUTPATIENT)
Dept: URBAN - METROPOLITAN AREA CLINIC 109 | Facility: CLINIC | Age: 53
Setting detail: OPHTHALMOLOGY
End: 2023-05-24
Payer: COMMERCIAL

## 2023-05-24 DIAGNOSIS — H40.013: ICD-10-CM

## 2023-05-24 DIAGNOSIS — H16.223: ICD-10-CM

## 2023-05-24 PROBLEM — H00.11 CHALAZION; RIGHT UPPER LID: Status: ACTIVE | Noted: 2023-05-24

## 2023-05-24 PROCEDURE — 92133 CPTRZD OPH DX IMG PST SGM ON: CPT | Performed by: OPHTHALMOLOGY

## 2023-05-24 PROCEDURE — 99213 OFFICE O/P EST LOW 20 MIN: CPT | Performed by: OPHTHALMOLOGY

## 2023-05-24 ASSESSMENT — REFRACTION_AUTOREFRACTION
OD_CYLINDER: -0.50
OS_AXIS: 45
OD_AXIS: 37
OS_CYLINDER: -0.50
OS_SPHERE: -3.00
OD_SPHERE: -4.00

## 2023-05-24 ASSESSMENT — VISUAL ACUITY
OS_BCVA: 20/30-
OD_BCVA: 20/30-2

## 2023-05-24 ASSESSMENT — SUPERFICIAL PUNCTATE KERATITIS (SPK)
OS_SPK: 2+
OD_SPK: 2+

## 2023-05-24 ASSESSMENT — SPHEQUIV_DERIVED
OD_SPHEQUIV: -4.25
OS_SPHEQUIV: -3.25

## 2023-05-24 ASSESSMENT — TONOMETRY
OS_IOP_MMHG: 14
OD_IOP_MMHG: 14

## 2023-05-24 ASSESSMENT — CONFRONTATIONAL VISUAL FIELD TEST (CVF)
OD_FINDINGS: FULL
OS_FINDINGS: FULL

## 2023-09-13 ENCOUNTER — OFFICE (OUTPATIENT)
Dept: URBAN - METROPOLITAN AREA CLINIC 109 | Facility: CLINIC | Age: 53
Setting detail: OPHTHALMOLOGY
End: 2023-09-13
Payer: COMMERCIAL

## 2023-09-13 DIAGNOSIS — H02.89: ICD-10-CM

## 2023-09-13 DIAGNOSIS — H40.013: ICD-10-CM

## 2023-09-13 DIAGNOSIS — H16.223: ICD-10-CM

## 2023-09-13 PROBLEM — H00.11 CHALAZION; RIGHT UPPER LID: Status: ACTIVE | Noted: 2023-09-13

## 2023-09-13 PROCEDURE — 99213 OFFICE O/P EST LOW 20 MIN: CPT | Performed by: OPHTHALMOLOGY

## 2023-09-13 ASSESSMENT — REFRACTION_AUTOREFRACTION
OS_AXIS: 45
OD_SPHERE: -4.00
OS_SPHERE: -3.00
OD_CYLINDER: -0.50
OD_AXIS: 37
OS_CYLINDER: -0.50

## 2023-09-13 ASSESSMENT — SPHEQUIV_DERIVED
OS_SPHEQUIV: -3.25
OD_SPHEQUIV: -4.25

## 2023-09-13 ASSESSMENT — CONFRONTATIONAL VISUAL FIELD TEST (CVF)
OD_FINDINGS: FULL
OS_FINDINGS: FULL

## 2023-09-13 ASSESSMENT — SUPERFICIAL PUNCTATE KERATITIS (SPK)
OS_SPK: 2+
OD_SPK: 2+

## 2023-09-13 ASSESSMENT — TONOMETRY
OS_IOP_MMHG: 13
OD_IOP_MMHG: 14

## 2023-09-13 ASSESSMENT — VISUAL ACUITY
OD_BCVA: 20/30-2
OS_BCVA: 20/30-

## 2024-03-12 ENCOUNTER — RX ONLY (RX ONLY)
Age: 54
End: 2024-03-12

## 2024-03-12 ENCOUNTER — OFFICE (OUTPATIENT)
Dept: URBAN - METROPOLITAN AREA CLINIC 109 | Facility: CLINIC | Age: 54
Setting detail: OPHTHALMOLOGY
End: 2024-03-12
Payer: COMMERCIAL

## 2024-03-12 DIAGNOSIS — H02.89: ICD-10-CM

## 2024-03-12 DIAGNOSIS — H16.223: ICD-10-CM

## 2024-03-12 DIAGNOSIS — H40.013: ICD-10-CM

## 2024-03-12 PROBLEM — H00.11 CHALAZION; RIGHT UPPER LID: Status: ACTIVE | Noted: 2024-03-12

## 2024-03-12 PROCEDURE — 92012 INTRM OPH EXAM EST PATIENT: CPT | Performed by: OPHTHALMOLOGY

## 2024-03-12 PROCEDURE — 92133 CPTRZD OPH DX IMG PST SGM ON: CPT | Performed by: OPHTHALMOLOGY

## 2024-03-12 PROCEDURE — 92083 EXTENDED VISUAL FIELD XM: CPT | Performed by: OPHTHALMOLOGY

## 2024-04-06 NOTE — ED PROVIDER NOTE - NSFOLLOWUPINSTRUCTIONS_ED_ALL_ED_FT
2 = difficulty swallowing liquids/foods You were seen in the Emergency Department for a dilated pupil. Your CT scans were negative for any aneurysms or head bleeds. The neurology team saw you and believes that your discharge is safe. We recommend you follow up with your ophthalmologist in the next 2-3 days. Please return to the ED if you having any new numbness, tingling, loss of strength or sudden onset headache.

## 2024-04-09 PROBLEM — H00.11 CHALAZION; RIGHT UPPER LID: Status: ACTIVE | Noted: 2024-04-09

## 2024-07-16 ENCOUNTER — OFFICE (OUTPATIENT)
Dept: URBAN - METROPOLITAN AREA CLINIC 109 | Facility: CLINIC | Age: 54
Setting detail: OPHTHALMOLOGY
End: 2024-07-16
Payer: COMMERCIAL

## 2024-07-16 DIAGNOSIS — H40.013: ICD-10-CM

## 2024-07-16 DIAGNOSIS — H16.223: ICD-10-CM

## 2024-07-16 DIAGNOSIS — H02.89: ICD-10-CM

## 2024-07-16 PROBLEM — H00.11 CHALAZION; RIGHT UPPER LID: Status: ACTIVE | Noted: 2024-07-16

## 2024-07-16 PROCEDURE — 99213 OFFICE O/P EST LOW 20 MIN: CPT | Performed by: OPHTHALMOLOGY

## 2024-07-16 ASSESSMENT — CONFRONTATIONAL VISUAL FIELD TEST (CVF)
OS_FINDINGS: FULL
OD_FINDINGS: FULL

## 2024-09-18 ENCOUNTER — OFFICE (OUTPATIENT)
Dept: URBAN - METROPOLITAN AREA CLINIC 109 | Facility: CLINIC | Age: 54
Setting detail: OPHTHALMOLOGY
End: 2024-09-18
Payer: COMMERCIAL

## 2024-09-18 DIAGNOSIS — H40.013: ICD-10-CM

## 2024-09-18 DIAGNOSIS — H16.223: ICD-10-CM

## 2024-09-18 DIAGNOSIS — H02.89: ICD-10-CM

## 2024-09-18 PROBLEM — H00.11 CHALAZION; RIGHT UPPER LID: Status: ACTIVE | Noted: 2024-09-18

## 2024-09-18 PROCEDURE — 92133 CPTRZD OPH DX IMG PST SGM ON: CPT | Performed by: OPHTHALMOLOGY

## 2024-09-18 PROCEDURE — 92083 EXTENDED VISUAL FIELD XM: CPT | Performed by: OPHTHALMOLOGY

## 2024-09-18 PROCEDURE — 99213 OFFICE O/P EST LOW 20 MIN: CPT | Performed by: OPHTHALMOLOGY

## 2024-09-18 ASSESSMENT — CONFRONTATIONAL VISUAL FIELD TEST (CVF)
OS_FINDINGS: FULL
OD_FINDINGS: FULL

## 2025-04-21 ENCOUNTER — RX ONLY (RX ONLY)
Age: 55
End: 2025-04-21

## 2025-04-21 ENCOUNTER — DOCTOR'S OFFICE (OUTPATIENT)
Facility: LOCATION | Age: 55
Setting detail: OPHTHALMOLOGY
End: 2025-04-21
Payer: COMMERCIAL

## 2025-04-21 DIAGNOSIS — H44.23: ICD-10-CM

## 2025-04-21 DIAGNOSIS — H43.393: ICD-10-CM

## 2025-04-21 DIAGNOSIS — H35.40: ICD-10-CM

## 2025-04-21 PROBLEM — H00.11 CHALAZION; RIGHT UPPER LID: Status: ACTIVE | Noted: 2025-04-21

## 2025-04-21 PROCEDURE — 92134 CPTRZ OPH DX IMG PST SGM RTA: CPT | Performed by: OPHTHALMOLOGY

## 2025-04-21 PROCEDURE — 92014 COMPRE OPH EXAM EST PT 1/>: CPT | Performed by: OPHTHALMOLOGY

## 2025-04-21 ASSESSMENT — KERATOMETRY
OS_AXISANGLE_DEGREES: 119
OD_AXISANGLE_DEGREES: 097
OS_K2POWER_DIOPTERS: 38.00
OD_K2POWER_DIOPTERS: 40.25
OD_K1POWER_DIOPTERS: 39.50
OS_K1POWER_DIOPTERS: 37.50

## 2025-04-21 ASSESSMENT — VISUAL ACUITY
OS_BCVA: 20/40+2
OD_BCVA: 20/25-2

## 2025-04-21 ASSESSMENT — REFRACTION_AUTOREFRACTION
OS_AXIS: 054
OD_CYLINDER: SPH
OS_SPHERE: -3.00
OS_CYLINDER: -1.25
OD_SPHERE: -4.25

## 2025-04-21 ASSESSMENT — SUPERFICIAL PUNCTATE KERATITIS (SPK)
OD_SPK: 2+
OS_SPK: 2+

## 2025-05-20 ENCOUNTER — OFFICE (OUTPATIENT)
Dept: URBAN - METROPOLITAN AREA CLINIC 109 | Facility: CLINIC | Age: 55
Setting detail: OPHTHALMOLOGY
End: 2025-05-20
Payer: COMMERCIAL

## 2025-05-20 DIAGNOSIS — H40.013: ICD-10-CM

## 2025-05-20 DIAGNOSIS — H16.223: ICD-10-CM

## 2025-05-20 PROBLEM — H00.11 CHALAZION; RIGHT UPPER LID: Status: ACTIVE | Noted: 2025-05-20

## 2025-05-20 PROCEDURE — 92133 CPTRZD OPH DX IMG PST SGM ON: CPT | Performed by: OPHTHALMOLOGY

## 2025-05-20 PROCEDURE — 99213 OFFICE O/P EST LOW 20 MIN: CPT | Performed by: OPHTHALMOLOGY

## 2025-05-20 ASSESSMENT — KERATOMETRY
OS_K2POWER_DIOPTERS: 38.00
OD_K2POWER_DIOPTERS: 40.25
OD_AXISANGLE_DEGREES: 097
OD_K1POWER_DIOPTERS: 39.50
OS_K1POWER_DIOPTERS: 37.50
OS_AXISANGLE_DEGREES: 119

## 2025-05-20 ASSESSMENT — REFRACTION_AUTOREFRACTION
OD_SPHERE: -4.75
OS_CYLINDER: -1.00
OD_CYLINDER: -0.25
OS_SPHERE: -3.50
OD_AXIS: 34
OS_AXIS: 57

## 2025-05-20 ASSESSMENT — SUPERFICIAL PUNCTATE KERATITIS (SPK)
OS_SPK: 2+
OD_SPK: 2+

## 2025-05-20 ASSESSMENT — TONOMETRY
OD_IOP_MMHG: 16
OS_IOP_MMHG: 14

## 2025-05-20 ASSESSMENT — VISUAL ACUITY
OS_BCVA: 20/40+2
OD_BCVA: 20/25-2

## 2025-05-20 ASSESSMENT — CONFRONTATIONAL VISUAL FIELD TEST (CVF)
OD_FINDINGS: FULL
OS_FINDINGS: FULL